# Patient Record
Sex: FEMALE | Race: WHITE | NOT HISPANIC OR LATINO | Employment: FULL TIME | URBAN - METROPOLITAN AREA
[De-identification: names, ages, dates, MRNs, and addresses within clinical notes are randomized per-mention and may not be internally consistent; named-entity substitution may affect disease eponyms.]

---

## 2023-04-14 ENCOUNTER — OFFICE VISIT (OUTPATIENT)
Dept: PRIMARY CARE | Facility: CLINIC | Age: 63
End: 2023-04-14
Payer: COMMERCIAL

## 2023-04-14 VITALS
DIASTOLIC BLOOD PRESSURE: 86 MMHG | SYSTOLIC BLOOD PRESSURE: 140 MMHG | OXYGEN SATURATION: 98 % | WEIGHT: 214 LBS | HEART RATE: 73 BPM | HEIGHT: 67 IN | BODY MASS INDEX: 33.59 KG/M2

## 2023-04-14 DIAGNOSIS — F41.9 ANXIETY: ICD-10-CM

## 2023-04-14 DIAGNOSIS — R91.8 GROUND GLASS OPACITY PRESENT ON IMAGING OF LUNG: ICD-10-CM

## 2023-04-14 DIAGNOSIS — E78.5 HYPERLIPIDEMIA, UNSPECIFIED HYPERLIPIDEMIA TYPE: ICD-10-CM

## 2023-04-14 DIAGNOSIS — R53.83 OTHER FATIGUE: ICD-10-CM

## 2023-04-14 DIAGNOSIS — I10 PRIMARY HYPERTENSION: ICD-10-CM

## 2023-04-14 DIAGNOSIS — R91.8 LUNG NODULES: ICD-10-CM

## 2023-04-14 DIAGNOSIS — K21.9 GASTROESOPHAGEAL REFLUX DISEASE WITHOUT ESOPHAGITIS: ICD-10-CM

## 2023-04-14 DIAGNOSIS — Z00.00 ANNUAL PHYSICAL EXAM: Primary | ICD-10-CM

## 2023-04-14 PROBLEM — N39.0 UTI (URINARY TRACT INFECTION): Status: RESOLVED | Noted: 2023-04-14 | Resolved: 2023-04-14

## 2023-04-14 PROBLEM — R49.0 VOICE HOARSENESS: Status: ACTIVE | Noted: 2023-04-14

## 2023-04-14 PROBLEM — K60.2 ANAL FISSURE: Status: RESOLVED | Noted: 2023-04-14 | Resolved: 2023-04-14

## 2023-04-14 PROBLEM — K60.2 ANAL FISSURE: Status: ACTIVE | Noted: 2023-04-14

## 2023-04-14 PROBLEM — N81.10 BLADDER PROLAPSE, FEMALE, ACQUIRED: Status: ACTIVE | Noted: 2023-04-14

## 2023-04-14 PROBLEM — N39.0 UTI (URINARY TRACT INFECTION): Status: ACTIVE | Noted: 2023-04-14

## 2023-04-14 PROCEDURE — 99396 PREV VISIT EST AGE 40-64: CPT | Performed by: SPECIALIST

## 2023-04-14 PROCEDURE — 3077F SYST BP >= 140 MM HG: CPT | Performed by: SPECIALIST

## 2023-04-14 PROCEDURE — 1036F TOBACCO NON-USER: CPT | Performed by: SPECIALIST

## 2023-04-14 PROCEDURE — 3079F DIAST BP 80-89 MM HG: CPT | Performed by: SPECIALIST

## 2023-04-14 RX ORDER — CHOLECALCIFEROL (VITAMIN D3) 125 MCG
1 CAPSULE ORAL DAILY
COMMUNITY
Start: 2020-01-24

## 2023-04-14 RX ORDER — CALCIUM CARBONATE 600 MG
600 TABLET ORAL DAILY PRN
COMMUNITY
Start: 2020-01-24

## 2023-04-14 RX ORDER — ROSUVASTATIN CALCIUM 5 MG/1
5 TABLET, COATED ORAL 3 TIMES WEEKLY
Qty: 45 TABLET | Refills: 1 | Status: SHIPPED | OUTPATIENT
Start: 2023-04-14 | End: 2023-04-21 | Stop reason: SDUPTHER

## 2023-04-14 RX ORDER — CITALOPRAM 10 MG/1
10 TABLET ORAL DAILY
COMMUNITY
Start: 2020-02-13 | End: 2023-04-14 | Stop reason: SDUPTHER

## 2023-04-14 RX ORDER — LOSARTAN POTASSIUM 25 MG/1
25 TABLET ORAL DAILY
Qty: 90 TABLET | Refills: 1 | Status: SHIPPED | OUTPATIENT
Start: 2023-04-14 | End: 2023-09-18

## 2023-04-14 RX ORDER — ECHINACEA 400 MG
1 CAPSULE ORAL DAILY PRN
COMMUNITY
Start: 2020-01-24

## 2023-04-14 RX ORDER — LANSOPRAZOLE 30 MG/1
30 CAPSULE, DELAYED RELEASE ORAL DAILY
Qty: 90 CAPSULE | Refills: 1 | Status: SHIPPED | OUTPATIENT
Start: 2023-04-14 | End: 2023-08-01 | Stop reason: SINTOL

## 2023-04-14 RX ORDER — ROSUVASTATIN CALCIUM 5 MG/1
5 TABLET, COATED ORAL 3 TIMES WEEKLY
COMMUNITY
End: 2023-04-14 | Stop reason: SDUPTHER

## 2023-04-14 RX ORDER — CITALOPRAM 10 MG/1
10 TABLET ORAL DAILY
Qty: 90 TABLET | Refills: 1 | Status: SHIPPED | OUTPATIENT
Start: 2023-04-14 | End: 2023-11-07

## 2023-04-14 RX ORDER — MULTIVIT-MIN/IRON/FOLIC ACID/K 18-600-40
1 CAPSULE ORAL DAILY
COMMUNITY

## 2023-04-14 RX ORDER — ZINC GLUCONATE 50 MG
1 TABLET ORAL DAILY
COMMUNITY

## 2023-04-14 NOTE — ASSESSMENT & PLAN NOTE
-Previously received annual influenza vaccine  -Previously received 2 covid vaccines, 2 boosters (last 8/2022), recommend bivalent booster  -Had Covid 6/2022  -Previously received Shingrix vaccines  -Previously received Tdap 8/2017  -Recommend Pneumonia vaccine at age 65 (either PCV15 followed by PPSV23 in one year or one PCV20)  -Mammogram 9/2022, recommend annually  -Continue annual gynecology exams  -Prior Colonoscopy 3/6/2020  -Labs ordered:

## 2023-04-14 NOTE — ASSESSMENT & PLAN NOTE
Recommend home bp monitoring   Starting losartan 25 mg daily  Implementing life style modifications

## 2023-04-14 NOTE — ASSESSMENT & PLAN NOTE
-Previously received annual influenza vaccine  -Previously received 2 covid vaccines, 2 boosters (last 8/2022), recommend bivalent booster  -Had Covid 6/2022  -Previously received Shingrix vaccines  -Previously received Tdap 8/2017  -Recommend Pneumonia vaccine at age 64 (either PCV15 followed by PPSV23 in one year or one PCV20)  -Mammogram 9/2022, recommend annually  -Continue annual gynecology exams  -Prior Colonoscopy 3/6/2020  -Reports prior negative screen for hepatitis C at [facility]  -Labs ordered:  CMP cbc TSH free T4 fx lipids

## 2023-04-14 NOTE — ASSESSMENT & PLAN NOTE
Taking 5 mg rosuvastatin 3 days per week, CT cardiac score 0 on 7/2022, ASCVD 7.3%, LDL 94 (10/2022) on rosuvastatin

## 2023-04-14 NOTE — PATIENT INSTRUCTIONS
Schedule CT  Get labs done  Will call with results  Get covid booster  Do home blood pressure monitoring (goal less than 130/80), start medication and follow-up in 2 months for bp check

## 2023-04-14 NOTE — PROGRESS NOTES
Subjective   Patient ID: Christy Costello is a 63 y.o. female who presents for Establish Care.  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  Goes to concerts, socially active    Does therapy and journal  Stopped chewing gum, drinks coffee 3 cups tomato sauce red wine will trigger reflux EGDwith grade a reflux esophagitis in 3/2020, has been off/on lansoprazole    Thinks ganglion cyst, declined ortho hand  Leg cramps at night using mag    Donates blood    Review of Systems  Constitutional  Some fatigue, no fevers, no chills, no unintentional weight loss,  Mood:   No sadness no hopelessness no anhedonia no suicidal ideation, stable on celexa, job is overwhelming  HEENT:   Occasional ocular migraine with floater and dizziness clears with advil and rest, no other headaches, no dizziness, no double vision, no blurred vision, no hearing loss, to see optho   Cardiovascular:  No chest pain, no palpitations, no shortness of breath with exertion (one flight of stairs)  Respiratory:  No cough, no hemoptysis, no wheezing, No shortness of breath at rest  GI:  No dysphagia, no odynophagia, occasional reflux,   no abdominal pain, no nausea, no vomiting, no changes in bowel/bladder habits, no bright red blood per rectum, no melena  :  No urinary frequency, no dysuria, no urine incontinence, nocturia x 2  MSK:  No falls, no joint pain, no joint swelling  Neuro:  No tremors, no extremity weakness, no changes in sensation      Objective   Physical Exam  General:    Well-appearing  F in no acute distress, well nourished, well hydrated  Head:  Normocephalic, atraumatic  Skin:          Warm dry,   Eyes:  Anicteric sclera, pupils equal,    Ears:        TMs intact  Oral:      Mask in place, Not examined due to pandemic  Neck:   Supple, no cervical/supraclavicular adenopathy, no thyromegaly or nodules appreciated on exam  Cor:      Regular rate, normal S1, S2, no murmurs appreciated, no S3, no S4   Lungs:   Clear to auscultation b/l, no wheezes, no rhonchi, no crackles, no accessory respiratory muscle use  Abd:          Soft, nontender, no guarding, no rebound, no hepatosplenomegaly appreciated   Ext:            No lower extremity edema, no palpable cords  Pulses:      Pedal pulses intact  Neuro:   CN2-12 grossly intact  (except CN 9-10, 12 not examined due to pandemic)    Breasts:     Not examined sees gynecology        Assessment/Plan   Problem List Items Addressed This Visit          Respiratory    Ground glass opacity present on imaging of lung     Quit smoking in 1987, incidentally noted 2 lung nodules on CT Cardiac Score, 4 mm LLL solid pleural based nodule and 2-3 mm solid nodule RLL and 4 mm ground glass nodular density RLL         Relevant Orders    CT chest wo IV contrast    CBC    Lung nodules     Quit smoking in 1987, incidentally noted 2 lung nodules on CT Cardiac Score, 4 mm LLL solid pleural based nodule and 2-3 mm solid nodule RLL and 4 mm ground glass nodular density RLL         Relevant Orders    CT chest wo IV contrast    CBC       Circulatory    Primary hypertension     Recommend home bp monitoring   Starting losartan 25 mg daily  Implementing life style modifications         Relevant Medications    losartan (Cozaar) 25 mg tablet       Digestive    GERD (gastroesophageal reflux disease)     EGD 3/2020 grade a reflux esophagitis, takes off/on lansoprazole 30 mg with good relief, ordered         Relevant Medications    lansoprazole (Prevacid) 30 mg DR capsule       Other    Anxiety     Continue therapy, stable on celexa 10 mg, refilled         Relevant Medications    citalopram (CeleXA) 10 mg tablet    Hyperlipidemia     Taking 5 mg  rosuvastatin 3 days per week, CT cardiac score 0 on 7/2022, ASCVD 7.3%, LDL 94 (10/2022) on rosuvastatin           Relevant Medications    rosuvastatin (Crestor) 5 mg tablet    Other Relevant Orders    Comprehensive Metabolic Panel    Lipid Panel    Annual physical exam - Primary     -Previously received annual influenza vaccine  -Previously received 2 covid vaccines, 2 boosters (last 8/2022), recommend bivalent booster  -Had Covid 6/2022  -Previously received Shingrix vaccines  -Previously received Tdap 8/2017  -Recommend Pneumonia vaccine at age 64 (either PCV15 followed by PPSV23 in one year or one PCV20)  -Mammogram 9/2022, recommend annually  -Continue annual gynecology exams  -Prior Colonoscopy 3/6/2020  -Reports prior negative screen for hepatitis C at [facility]  -Labs ordered:  CMP cbc TSH free T4 fx lipids         Relevant Orders    Comprehensive Metabolic Panel    TSH    T4, free    CBC    Other fatigue    Relevant Orders    TSH    T4, free    CBC          Annabel Worthy DO

## 2023-04-14 NOTE — ASSESSMENT & PLAN NOTE
Quit smoking in 1987, incidentally noted 2 lung nodules on CT Cardiac Score, 4 mm LLL solid pleural based nodule and 2-3 mm solid nodule RLL and 4 mm ground glass nodular density RLL

## 2023-04-17 ENCOUNTER — LAB (OUTPATIENT)
Dept: LAB | Facility: LAB | Age: 63
End: 2023-04-17
Payer: COMMERCIAL

## 2023-04-17 DIAGNOSIS — R91.8 LUNG NODULES: ICD-10-CM

## 2023-04-17 DIAGNOSIS — R91.8 GROUND GLASS OPACITY PRESENT ON IMAGING OF LUNG: ICD-10-CM

## 2023-04-17 DIAGNOSIS — Z00.00 ANNUAL PHYSICAL EXAM: ICD-10-CM

## 2023-04-17 DIAGNOSIS — R53.83 OTHER FATIGUE: ICD-10-CM

## 2023-04-17 DIAGNOSIS — E78.5 HYPERLIPIDEMIA, UNSPECIFIED HYPERLIPIDEMIA TYPE: ICD-10-CM

## 2023-04-17 LAB
ALANINE AMINOTRANSFERASE (SGPT) (U/L) IN SER/PLAS: 18 U/L (ref 7–45)
ALBUMIN (G/DL) IN SER/PLAS: 4.3 G/DL (ref 3.4–5)
ALKALINE PHOSPHATASE (U/L) IN SER/PLAS: 65 U/L (ref 33–136)
ANION GAP IN SER/PLAS: 13 MMOL/L (ref 10–20)
ASPARTATE AMINOTRANSFERASE (SGOT) (U/L) IN SER/PLAS: 24 U/L (ref 9–39)
BILIRUBIN TOTAL (MG/DL) IN SER/PLAS: 0.7 MG/DL (ref 0–1.2)
CALCIUM (MG/DL) IN SER/PLAS: 9.5 MG/DL (ref 8.6–10.6)
CARBON DIOXIDE, TOTAL (MMOL/L) IN SER/PLAS: 25 MMOL/L (ref 21–32)
CHLORIDE (MMOL/L) IN SER/PLAS: 106 MMOL/L (ref 98–107)
CHOLESTEROL (MG/DL) IN SER/PLAS: 181 MG/DL (ref 0–199)
CHOLESTEROL IN HDL (MG/DL) IN SER/PLAS: 42.7 MG/DL
CHOLESTEROL/HDL RATIO: 4.2
CREATININE (MG/DL) IN SER/PLAS: 1 MG/DL (ref 0.5–1.05)
ERYTHROCYTE DISTRIBUTION WIDTH (RATIO) BY AUTOMATED COUNT: 15.5 % (ref 11.5–14.5)
ERYTHROCYTE MEAN CORPUSCULAR HEMOGLOBIN CONCENTRATION (G/DL) BY AUTOMATED: 30.5 G/DL (ref 32–36)
ERYTHROCYTE MEAN CORPUSCULAR VOLUME (FL) BY AUTOMATED COUNT: 89 FL (ref 80–100)
ERYTHROCYTES (10*6/UL) IN BLOOD BY AUTOMATED COUNT: 4.97 X10E12/L (ref 4–5.2)
GFR FEMALE: 63 ML/MIN/1.73M2
GLUCOSE (MG/DL) IN SER/PLAS: 95 MG/DL (ref 74–99)
HEMATOCRIT (%) IN BLOOD BY AUTOMATED COUNT: 44.2 % (ref 36–46)
HEMOGLOBIN (G/DL) IN BLOOD: 13.5 G/DL (ref 12–16)
LDL: 114 MG/DL (ref 0–99)
LEUKOCYTES (10*3/UL) IN BLOOD BY AUTOMATED COUNT: 7.1 X10E9/L (ref 4.4–11.3)
NRBC (PER 100 WBCS) BY AUTOMATED COUNT: 0 /100 WBC (ref 0–0)
PLATELETS (10*3/UL) IN BLOOD AUTOMATED COUNT: 280 X10E9/L (ref 150–450)
POTASSIUM (MMOL/L) IN SER/PLAS: 4.7 MMOL/L (ref 3.5–5.3)
PROTEIN TOTAL: 7.4 G/DL (ref 6.4–8.2)
SODIUM (MMOL/L) IN SER/PLAS: 139 MMOL/L (ref 136–145)
THYROTROPIN (MIU/L) IN SER/PLAS BY DETECTION LIMIT <= 0.05 MIU/L: 2.55 MIU/L (ref 0.44–3.98)
THYROXINE (T4) FREE (NG/DL) IN SER/PLAS: 0.93 NG/DL (ref 0.78–1.48)
TRIGLYCERIDE (MG/DL) IN SER/PLAS: 124 MG/DL (ref 0–149)
UREA NITROGEN (MG/DL) IN SER/PLAS: 12 MG/DL (ref 6–23)
VLDL: 25 MG/DL (ref 0–40)

## 2023-04-17 PROCEDURE — 85027 COMPLETE CBC AUTOMATED: CPT

## 2023-04-17 PROCEDURE — 36415 COLL VENOUS BLD VENIPUNCTURE: CPT

## 2023-04-17 PROCEDURE — 84439 ASSAY OF FREE THYROXINE: CPT

## 2023-04-17 PROCEDURE — 80061 LIPID PANEL: CPT

## 2023-04-17 PROCEDURE — 84443 ASSAY THYROID STIM HORMONE: CPT

## 2023-04-17 PROCEDURE — 80053 COMPREHEN METABOLIC PANEL: CPT

## 2023-04-21 DIAGNOSIS — I10 PRIMARY HYPERTENSION: Primary | ICD-10-CM

## 2023-04-21 DIAGNOSIS — E78.5 HYPERLIPIDEMIA, UNSPECIFIED HYPERLIPIDEMIA TYPE: ICD-10-CM

## 2023-04-21 RX ORDER — ROSUVASTATIN CALCIUM 5 MG/1
5 TABLET, COATED ORAL DAILY
Qty: 90 TABLET | Refills: 1 | Status: SHIPPED | OUTPATIENT
Start: 2023-04-21 | End: 2023-10-31

## 2023-07-20 ENCOUNTER — LAB (OUTPATIENT)
Dept: LAB | Facility: LAB | Age: 63
End: 2023-07-20
Payer: COMMERCIAL

## 2023-07-20 DIAGNOSIS — E78.5 HYPERLIPIDEMIA, UNSPECIFIED HYPERLIPIDEMIA TYPE: ICD-10-CM

## 2023-07-20 DIAGNOSIS — I10 PRIMARY HYPERTENSION: ICD-10-CM

## 2023-07-20 LAB
ALANINE AMINOTRANSFERASE (SGPT) (U/L) IN SER/PLAS: 18 U/L (ref 7–45)
ALBUMIN (G/DL) IN SER/PLAS: 4 G/DL (ref 3.4–5)
ALKALINE PHOSPHATASE (U/L) IN SER/PLAS: 56 U/L (ref 33–136)
ASPARTATE AMINOTRANSFERASE (SGOT) (U/L) IN SER/PLAS: 22 U/L (ref 9–39)
BILIRUBIN DIRECT (MG/DL) IN SER/PLAS: 0.1 MG/DL (ref 0–0.3)
BILIRUBIN TOTAL (MG/DL) IN SER/PLAS: 0.6 MG/DL (ref 0–1.2)
CHOLESTEROL (MG/DL) IN SER/PLAS: 118 MG/DL (ref 0–199)
CHOLESTEROL IN HDL (MG/DL) IN SER/PLAS: 42.4 MG/DL
CHOLESTEROL/HDL RATIO: 2.8
ERYTHROCYTE DISTRIBUTION WIDTH (RATIO) BY AUTOMATED COUNT: 16.8 % (ref 11.5–14.5)
ERYTHROCYTE MEAN CORPUSCULAR HEMOGLOBIN CONCENTRATION (G/DL) BY AUTOMATED: 30.6 G/DL (ref 32–36)
ERYTHROCYTE MEAN CORPUSCULAR VOLUME (FL) BY AUTOMATED COUNT: 93 FL (ref 80–100)
ERYTHROCYTES (10*6/UL) IN BLOOD BY AUTOMATED COUNT: 4.8 X10E12/L (ref 4–5.2)
HEMATOCRIT (%) IN BLOOD BY AUTOMATED COUNT: 44.5 % (ref 36–46)
HEMOGLOBIN (G/DL) IN BLOOD: 13.6 G/DL (ref 12–16)
LDL: 57 MG/DL (ref 0–99)
LEUKOCYTES (10*3/UL) IN BLOOD BY AUTOMATED COUNT: 6 X10E9/L (ref 4.4–11.3)
NRBC (PER 100 WBCS) BY AUTOMATED COUNT: 0 /100 WBC (ref 0–0)
PLATELETS (10*3/UL) IN BLOOD AUTOMATED COUNT: 260 X10E9/L (ref 150–450)
PROTEIN TOTAL: 6.7 G/DL (ref 6.4–8.2)
TRIGLYCERIDE (MG/DL) IN SER/PLAS: 94 MG/DL (ref 0–149)
VLDL: 19 MG/DL (ref 0–40)

## 2023-07-20 PROCEDURE — 36415 COLL VENOUS BLD VENIPUNCTURE: CPT

## 2023-07-20 PROCEDURE — 85027 COMPLETE CBC AUTOMATED: CPT

## 2023-07-20 PROCEDURE — 80076 HEPATIC FUNCTION PANEL: CPT

## 2023-07-20 PROCEDURE — 80061 LIPID PANEL: CPT

## 2023-07-26 ENCOUNTER — OFFICE VISIT (OUTPATIENT)
Dept: PRIMARY CARE | Facility: CLINIC | Age: 63
End: 2023-07-26
Payer: COMMERCIAL

## 2023-07-26 VITALS
BODY MASS INDEX: 33.83 KG/M2 | SYSTOLIC BLOOD PRESSURE: 124 MMHG | OXYGEN SATURATION: 91 % | HEART RATE: 103 BPM | DIASTOLIC BLOOD PRESSURE: 78 MMHG | WEIGHT: 216 LBS

## 2023-07-26 DIAGNOSIS — E78.5 HYPERLIPIDEMIA, UNSPECIFIED HYPERLIPIDEMIA TYPE: ICD-10-CM

## 2023-07-26 DIAGNOSIS — R25.2 LEG CRAMPS: ICD-10-CM

## 2023-07-26 DIAGNOSIS — I10 PRIMARY HYPERTENSION: Primary | ICD-10-CM

## 2023-07-26 DIAGNOSIS — K21.9 GASTROESOPHAGEAL REFLUX DISEASE WITHOUT ESOPHAGITIS: ICD-10-CM

## 2023-07-26 PROCEDURE — 99214 OFFICE O/P EST MOD 30 MIN: CPT | Performed by: SPECIALIST

## 2023-07-26 PROCEDURE — 1036F TOBACCO NON-USER: CPT | Performed by: SPECIALIST

## 2023-07-26 PROCEDURE — 3078F DIAST BP <80 MM HG: CPT | Performed by: SPECIALIST

## 2023-07-26 PROCEDURE — 3074F SYST BP LT 130 MM HG: CPT | Performed by: SPECIALIST

## 2023-07-26 ASSESSMENT — PATIENT HEALTH QUESTIONNAIRE - PHQ9
2. FEELING DOWN, DEPRESSED OR HOPELESS: NOT AT ALL
SUM OF ALL RESPONSES TO PHQ9 QUESTIONS 1 AND 2: 0
1. LITTLE INTEREST OR PLEASURE IN DOING THINGS: NOT AT ALL

## 2023-07-26 ASSESSMENT — ENCOUNTER SYMPTOMS
DEPRESSION: 0
OCCASIONAL FEELINGS OF UNSTEADINESS: 0
LOSS OF SENSATION IN FEET: 0

## 2023-07-26 NOTE — PROGRESS NOTES
Subjective   Patient ID: Christy Costello is a 63 y.o. female who presents for Follow-up (BP  and lipids).  HPI    62 yo female Pmhx sig for Basal Cell Skin Cancer (sees derm annually), Hyperlipidemia, Gerd, Anxiety here for follow-up    Gets intense leg cramp when lying down  Discussed could try low dose CoQ 10 and stretching before going to bed    Unsure of vitamin dose  She stopped prevacid    Gets GI symptom on prevacid, gets diarrhea     Takes mag-glycinate 625     Eating better, exercising more, weight last visit 214 and weight today 216, feels some bloating    Getting Chest CT in August for ground glass nodules and nodules on CT cardiac score last year    Playing [employer] ball, bicycling regularly   LDL down to 57 on 5 mg daily rosuvastatin  Labs reviewed and discussed  Home Blood pressure 124/83     Did not f/up with GI about esophagitis (negative eosinophil esophagitis on bx)/mild chronic inactive gastritis    Allergies   Allergen Reactions    Pollen Extracts Itching and Runny nose      Current Outpatient Medications   Medication Instructions    ascorbic acid, vitamin C, 500 mg capsule 1 tablet, oral, Daily    calcium carbonate 600 mg, oral, Daily PRN    cholecalciferol (Vitamin D-3) 125 MCG (5000 UT) capsule 1 capsule, oral, Daily    citalopram (CELEXA) 10 mg, oral, Daily    elderberry fruit and flower 460-115 mg capsule 1 tablet, oral, Daily PRN    lansoprazole (PREVACID) 30 mg, oral, Daily, Do not crush or chew.     losartan (COZAAR) 25 mg, oral, Daily    magnesium oxide 500 mg capsule 1 capsule, oral, Daily    rosuvastatin (CRESTOR) 5 mg, oral, Daily       Review of Systems  Constitutional  No fatigue, no fevers, no chills, no unintentional weight loss,   HEENT:  No headaches, no dizziness,  Cardiovascular:  No chest pain, no palpitations, no shortness of breath with exertion (one flight of stairs)  Respiratory:  No cough, no hemoptysis, no wheezing, No shortness of breath at rest  GI:  No dysphagia, no  odynophagia, no reflux, decreased throat symptoms,   no abdominal pain, no nausea, no vomiting, no changes in bowel habits, no bright red blood per rectum, no melena  :  No urinary frequency, no dysuria, no urine incontinence  MSK:  No falls, hand feet joint pain, no joint swelling    Physical Exam  /78   Pulse 103   Wt 98 kg (216 lb)   SpO2 91%   BMI 33.83 kg/m²   124/78 right arm large cuff  General:    Well-appearing  F in no acute distress, well nourished, well hydrated  Head:  Normocephalic, atraumatic  Skin:          Warm dry,   Eyes:  Anicteric sclera, pupils equal,   Oral:      Not examined due to pandemic  Neck:   Supple,   Cor:      Regular rate, normal S1, S2, no murmurs appreciated, no S3, no S4   Lungs:   Clear to auscultation b/l, no wheezes, no rhonchi, no crackles, no accessory respiratory muscle use  Abd:          Soft, nontender, no guarding, no rebound, no hepatosplenomegaly appreciated       Assessment/Plan   Problem List Items Addressed This Visit       Hyperlipidemia     LDL much improved, down to 57 (was 114) and Normal LFTs (7/20/2023) on rosuvastatin 5 mg daily, continue current medication           Primary hypertension - Primary     Home BP running 124/83  BP was 124/78 today  Continue current medication Losartan 25 mg daily  Continues to implement life style modifications            Due for wellness exam in October       Annabel Worthy DO

## 2023-08-01 PROBLEM — R25.2 LEG CRAMPS: Status: ACTIVE | Noted: 2023-08-01

## 2023-08-01 NOTE — ASSESSMENT & PLAN NOTE
Home BP running 124/83  BP was 124/78 today  Continue current medication Losartan 25 mg daily  Continues to implement life style modifications

## 2023-08-01 NOTE — ASSESSMENT & PLAN NOTE
Chronic active gastritis on EGD 2020  Intolerant of PPI Prevacid due to diarrhea  Consider Pepcid as needed

## 2023-08-01 NOTE — ASSESSMENT & PLAN NOTE
LDL much improved, down to 57 (was 114) and Normal LFTs (7/20/2023) on rosuvastatin 5 mg daily, continue current medication

## 2023-08-01 NOTE — ASSESSMENT & PLAN NOTE
Discussed, to try nocturnal stretching, may try adding CoQ10  To let me know if cramps persist, will check CK

## 2023-09-18 DIAGNOSIS — I10 PRIMARY HYPERTENSION: ICD-10-CM

## 2023-09-18 RX ORDER — LOSARTAN POTASSIUM 25 MG/1
25 TABLET ORAL DAILY
Qty: 90 TABLET | Refills: 0 | Status: SHIPPED | OUTPATIENT
Start: 2023-09-18 | End: 2023-12-19

## 2023-09-27 ENCOUNTER — APPOINTMENT (OUTPATIENT)
Dept: PRIMARY CARE | Facility: CLINIC | Age: 63
End: 2023-09-27
Payer: COMMERCIAL

## 2023-10-06 ENCOUNTER — APPOINTMENT (OUTPATIENT)
Dept: RADIOLOGY | Facility: CLINIC | Age: 63
End: 2023-10-06
Payer: COMMERCIAL

## 2023-10-06 DIAGNOSIS — Z12.31 SCREENING MAMMOGRAM FOR BREAST CANCER: ICD-10-CM

## 2023-10-30 DIAGNOSIS — E78.5 HYPERLIPIDEMIA, UNSPECIFIED HYPERLIPIDEMIA TYPE: ICD-10-CM

## 2023-10-31 RX ORDER — ROSUVASTATIN CALCIUM 5 MG/1
5 TABLET, COATED ORAL DAILY
Qty: 90 TABLET | Refills: 3 | Status: SHIPPED | OUTPATIENT
Start: 2023-10-31

## 2023-11-06 DIAGNOSIS — F41.9 ANXIETY: ICD-10-CM

## 2023-11-07 RX ORDER — CITALOPRAM 10 MG/1
10 TABLET ORAL DAILY
Qty: 90 TABLET | Refills: 1 | Status: SHIPPED | OUTPATIENT
Start: 2023-11-07 | End: 2024-05-07

## 2023-11-20 ENCOUNTER — LAB (OUTPATIENT)
Dept: LAB | Facility: LAB | Age: 63
End: 2023-11-20
Payer: COMMERCIAL

## 2023-11-20 DIAGNOSIS — E78.5 HYPERLIPIDEMIA, UNSPECIFIED HYPERLIPIDEMIA TYPE: ICD-10-CM

## 2023-11-20 DIAGNOSIS — I10 PRIMARY HYPERTENSION: ICD-10-CM

## 2023-11-20 LAB
ALBUMIN SERPL BCP-MCNC: 4.1 G/DL (ref 3.4–5)
ALP SERPL-CCNC: 57 U/L (ref 33–136)
ALT SERPL W P-5'-P-CCNC: 13 U/L (ref 7–45)
ANION GAP SERPL CALC-SCNC: 13 MMOL/L (ref 10–20)
AST SERPL W P-5'-P-CCNC: 19 U/L (ref 9–39)
BILIRUB SERPL-MCNC: 0.5 MG/DL (ref 0–1.2)
BUN SERPL-MCNC: 13 MG/DL (ref 6–23)
CALCIUM SERPL-MCNC: 9.3 MG/DL (ref 8.6–10.6)
CHLORIDE SERPL-SCNC: 106 MMOL/L (ref 98–107)
CHOLEST SERPL-MCNC: 121 MG/DL (ref 0–199)
CHOLESTEROL/HDL RATIO: 2.8
CO2 SERPL-SCNC: 27 MMOL/L (ref 21–32)
CREAT SERPL-MCNC: 0.96 MG/DL (ref 0.5–1.05)
GFR SERPL CREATININE-BSD FRML MDRD: 66 ML/MIN/1.73M*2
GLUCOSE SERPL-MCNC: 93 MG/DL (ref 74–99)
HDLC SERPL-MCNC: 42.6 MG/DL
LDLC SERPL CALC-MCNC: 58 MG/DL
NON HDL CHOLESTEROL: 78 MG/DL (ref 0–149)
POTASSIUM SERPL-SCNC: 4.8 MMOL/L (ref 3.5–5.3)
PROT SERPL-MCNC: 7.1 G/DL (ref 6.4–8.2)
SODIUM SERPL-SCNC: 141 MMOL/L (ref 136–145)
TRIGL SERPL-MCNC: 104 MG/DL (ref 0–149)
VLDL: 21 MG/DL (ref 0–40)

## 2023-11-20 PROCEDURE — 36415 COLL VENOUS BLD VENIPUNCTURE: CPT

## 2023-11-20 PROCEDURE — 80061 LIPID PANEL: CPT

## 2023-11-20 PROCEDURE — 80053 COMPREHEN METABOLIC PANEL: CPT

## 2023-11-28 ENCOUNTER — APPOINTMENT (OUTPATIENT)
Dept: RADIOLOGY | Facility: CLINIC | Age: 63
End: 2023-11-28
Payer: COMMERCIAL

## 2023-12-18 DIAGNOSIS — I10 PRIMARY HYPERTENSION: ICD-10-CM

## 2023-12-19 RX ORDER — LOSARTAN POTASSIUM 25 MG/1
25 TABLET ORAL DAILY
Qty: 90 TABLET | Refills: 2 | Status: SHIPPED | OUTPATIENT
Start: 2023-12-19

## 2023-12-27 ENCOUNTER — ANCILLARY PROCEDURE (OUTPATIENT)
Dept: RADIOLOGY | Facility: CLINIC | Age: 63
End: 2023-12-27
Payer: COMMERCIAL

## 2023-12-27 DIAGNOSIS — Z12.39 ENCOUNTER FOR OTHER SCREENING FOR MALIGNANT NEOPLASM OF BREAST: ICD-10-CM

## 2023-12-27 PROCEDURE — 77067 SCR MAMMO BI INCL CAD: CPT

## 2023-12-27 PROCEDURE — 77063 BREAST TOMOSYNTHESIS BI: CPT | Mod: BILATERAL PROCEDURE | Performed by: RADIOLOGY

## 2023-12-27 PROCEDURE — 77067 SCR MAMMO BI INCL CAD: CPT | Mod: BILATERAL PROCEDURE | Performed by: RADIOLOGY

## 2024-01-08 ENCOUNTER — APPOINTMENT (OUTPATIENT)
Dept: OBSTETRICS AND GYNECOLOGY | Facility: CLINIC | Age: 64
End: 2024-01-08
Payer: COMMERCIAL

## 2024-05-06 DIAGNOSIS — F41.9 ANXIETY: ICD-10-CM

## 2024-05-07 RX ORDER — CITALOPRAM 10 MG/1
10 TABLET ORAL DAILY
Qty: 90 TABLET | Refills: 1 | Status: SHIPPED | OUTPATIENT
Start: 2024-05-07

## 2024-05-28 DIAGNOSIS — Z00.00 MEDICARE WELCOME VISIT: ICD-10-CM

## 2024-05-28 DIAGNOSIS — Z00.00 ANNUAL PHYSICAL EXAM: ICD-10-CM

## 2024-05-28 DIAGNOSIS — Z11.59 ENCOUNTER FOR SCREENING FOR OTHER VIRAL DISEASES: Primary | ICD-10-CM

## 2024-05-28 DIAGNOSIS — E78.5 HYPERLIPIDEMIA, UNSPECIFIED HYPERLIPIDEMIA TYPE: ICD-10-CM

## 2024-05-28 DIAGNOSIS — I10 PRIMARY HYPERTENSION: ICD-10-CM

## 2024-08-08 ENCOUNTER — LAB (OUTPATIENT)
Dept: LAB | Facility: LAB | Age: 64
End: 2024-08-08
Payer: MEDICARE

## 2024-08-08 DIAGNOSIS — E78.5 HYPERLIPIDEMIA, UNSPECIFIED HYPERLIPIDEMIA TYPE: ICD-10-CM

## 2024-08-08 DIAGNOSIS — Z11.59 ENCOUNTER FOR SCREENING FOR OTHER VIRAL DISEASES: ICD-10-CM

## 2024-08-08 DIAGNOSIS — I10 PRIMARY HYPERTENSION: ICD-10-CM

## 2024-08-08 LAB
ALBUMIN SERPL BCP-MCNC: 4.2 G/DL (ref 3.4–5)
ALP SERPL-CCNC: 60 U/L (ref 33–136)
ALT SERPL W P-5'-P-CCNC: 18 U/L (ref 7–45)
ANION GAP SERPL CALC-SCNC: 12 MMOL/L (ref 10–20)
AST SERPL W P-5'-P-CCNC: 25 U/L (ref 9–39)
BILIRUB SERPL-MCNC: 0.5 MG/DL (ref 0–1.2)
BUN SERPL-MCNC: 11 MG/DL (ref 6–23)
CALCIUM SERPL-MCNC: 9.2 MG/DL (ref 8.6–10.6)
CHLORIDE SERPL-SCNC: 108 MMOL/L (ref 98–107)
CHOLEST SERPL-MCNC: 124 MG/DL (ref 0–199)
CHOLESTEROL/HDL RATIO: 3
CO2 SERPL-SCNC: 26 MMOL/L (ref 21–32)
CREAT SERPL-MCNC: 0.88 MG/DL (ref 0.5–1.05)
EGFRCR SERPLBLD CKD-EPI 2021: 73 ML/MIN/1.73M*2
ERYTHROCYTE [DISTWIDTH] IN BLOOD BY AUTOMATED COUNT: 16 % (ref 11.5–14.5)
GLUCOSE SERPL-MCNC: 104 MG/DL (ref 74–99)
HCT VFR BLD AUTO: 45 % (ref 36–46)
HCV AB SER QL: NONREACTIVE
HDLC SERPL-MCNC: 41.4 MG/DL
HGB BLD-MCNC: 14.2 G/DL (ref 12–16)
LDLC SERPL CALC-MCNC: 63 MG/DL
MCH RBC QN AUTO: 28.3 PG (ref 26–34)
MCHC RBC AUTO-ENTMCNC: 31.6 G/DL (ref 32–36)
MCV RBC AUTO: 90 FL (ref 80–100)
NON HDL CHOLESTEROL: 83 MG/DL (ref 0–149)
NRBC BLD-RTO: 0 /100 WBCS (ref 0–0)
PLATELET # BLD AUTO: 254 X10*3/UL (ref 150–450)
POTASSIUM SERPL-SCNC: 4.9 MMOL/L (ref 3.5–5.3)
PROT SERPL-MCNC: 7.1 G/DL (ref 6.4–8.2)
RBC # BLD AUTO: 5.01 X10*6/UL (ref 4–5.2)
SODIUM SERPL-SCNC: 141 MMOL/L (ref 136–145)
TRIGL SERPL-MCNC: 99 MG/DL (ref 0–149)
VLDL: 20 MG/DL (ref 0–40)
WBC # BLD AUTO: 6.6 X10*3/UL (ref 4.4–11.3)

## 2024-08-08 PROCEDURE — 85027 COMPLETE CBC AUTOMATED: CPT

## 2024-08-08 PROCEDURE — 86803 HEPATITIS C AB TEST: CPT

## 2024-08-08 PROCEDURE — 80053 COMPREHEN METABOLIC PANEL: CPT

## 2024-08-08 PROCEDURE — 80061 LIPID PANEL: CPT

## 2024-08-16 ENCOUNTER — APPOINTMENT (OUTPATIENT)
Dept: PRIMARY CARE | Facility: CLINIC | Age: 64
End: 2024-08-16
Payer: COMMERCIAL

## 2024-08-16 VITALS
HEART RATE: 80 BPM | WEIGHT: 220.6 LBS | OXYGEN SATURATION: 97 % | BODY MASS INDEX: 34.62 KG/M2 | SYSTOLIC BLOOD PRESSURE: 112 MMHG | DIASTOLIC BLOOD PRESSURE: 78 MMHG | HEIGHT: 67 IN

## 2024-08-16 DIAGNOSIS — Z00.00 WELCOME TO MEDICARE PREVENTIVE VISIT: Primary | ICD-10-CM

## 2024-08-16 DIAGNOSIS — R91.1 LUNG NODULE SEEN ON IMAGING STUDY: ICD-10-CM

## 2024-08-16 DIAGNOSIS — E66.9 OBESITY, CLASS II, BMI 35-39.9, ISOLATED (SEE ACTUAL BMI): ICD-10-CM

## 2024-08-16 DIAGNOSIS — K76.0 FATTY LIVER: ICD-10-CM

## 2024-08-16 DIAGNOSIS — E78.5 HYPERLIPIDEMIA, UNSPECIFIED HYPERLIPIDEMIA TYPE: ICD-10-CM

## 2024-08-16 DIAGNOSIS — I10 PRIMARY HYPERTENSION: ICD-10-CM

## 2024-08-16 DIAGNOSIS — F33.41 MAJOR DEPRESSIVE DISORDER, RECURRENT EPISODE, IN PARTIAL REMISSION (CMS-HCC): ICD-10-CM

## 2024-08-16 DIAGNOSIS — F41.1 GAD (GENERALIZED ANXIETY DISORDER): ICD-10-CM

## 2024-08-16 DIAGNOSIS — K21.9 GASTROESOPHAGEAL REFLUX DISEASE WITHOUT ESOPHAGITIS: ICD-10-CM

## 2024-08-16 PROBLEM — F33.1 MODERATE EPISODE OF RECURRENT MAJOR DEPRESSIVE DISORDER (MULTI): Status: ACTIVE | Noted: 2024-08-16

## 2024-08-16 PROCEDURE — G0402 INITIAL PREVENTIVE EXAM: HCPCS | Performed by: SPECIALIST

## 2024-08-16 PROCEDURE — 1159F MED LIST DOCD IN RCRD: CPT | Performed by: SPECIALIST

## 2024-08-16 PROCEDURE — 3008F BODY MASS INDEX DOCD: CPT | Performed by: SPECIALIST

## 2024-08-16 PROCEDURE — 3078F DIAST BP <80 MM HG: CPT | Performed by: SPECIALIST

## 2024-08-16 PROCEDURE — 1170F FXNL STATUS ASSESSED: CPT | Performed by: SPECIALIST

## 2024-08-16 PROCEDURE — 3074F SYST BP LT 130 MM HG: CPT | Performed by: SPECIALIST

## 2024-08-16 PROCEDURE — 1160F RVW MEDS BY RX/DR IN RCRD: CPT | Performed by: SPECIALIST

## 2024-08-16 RX ORDER — LANSOPRAZOLE 30 MG/1
30 CAPSULE, DELAYED RELEASE ORAL
Qty: 90 CAPSULE | Refills: 1 | Status: SHIPPED | OUTPATIENT
Start: 2024-08-16 | End: 2024-08-19

## 2024-08-16 RX ORDER — CITALOPRAM 20 MG/1
20 TABLET, FILM COATED ORAL DAILY
Qty: 90 TABLET | Refills: 1 | Status: SHIPPED | OUTPATIENT
Start: 2024-08-16 | End: 2025-02-12

## 2024-08-16 ASSESSMENT — PATIENT HEALTH QUESTIONNAIRE - PHQ9
SUM OF ALL RESPONSES TO PHQ9 QUESTIONS 1 AND 2: 0
1. LITTLE INTEREST OR PLEASURE IN DOING THINGS: NOT AT ALL
2. FEELING DOWN, DEPRESSED OR HOPELESS: NOT AT ALL
SUM OF ALL RESPONSES TO PHQ9 QUESTIONS 1 AND 2: 0
2. FEELING DOWN, DEPRESSED OR HOPELESS: NOT AT ALL
1. LITTLE INTEREST OR PLEASURE IN DOING THINGS: NOT AT ALL

## 2024-08-16 ASSESSMENT — VISUAL ACUITY
OD_CC: 20/20
OS_CC: 20/20

## 2024-08-16 ASSESSMENT — ACTIVITIES OF DAILY LIVING (ADL)
DOING_HOUSEWORK: INDEPENDENT
TAKING_MEDICATION: INDEPENDENT
MANAGING_FINANCES: INDEPENDENT
BATHING: INDEPENDENT
DRESSING: INDEPENDENT
GROCERY_SHOPPING: INDEPENDENT

## 2024-08-16 ASSESSMENT — ENCOUNTER SYMPTOMS
OCCASIONAL FEELINGS OF UNSTEADINESS: 0
DEPRESSION: 0
LOSS OF SENSATION IN FEET: 0

## 2024-08-16 NOTE — PROGRESS NOTES
Subjective   Patient ID: Christy Costello is a 64 y.o. female who presents for Annual Exam.  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  No history of rash, hives, anaphylaxis, or other hypersensitivity reaction.      Current Outpatient Medications   Medication Instructions    calcium carbonate 600 mg, oral, Daily PRN    cholecalciferol (Vitamin D-3) 125 MCG (5000 UT) capsule 1 capsule, oral, Daily    citalopram (CELEXA) 20 mg, oral, Daily    elderberry fruit and flower 460-115 mg capsule 1 tablet, oral, Daily PRN    losartan (COZAAR) 25 mg, oral, Daily    magnesium oxide 500 mg capsule 1 capsule, oral, Daily    omeprazole (PRILOSEC) 20 mg, oral, Daily, Do not crush or chew.    rosuvastatin (CRESTOR) 5 mg, oral, Daily        Review of Systems  Constitutional  No fatigue, no fevers, no chills, no unintentional weight loss (Up 4 #)  Mood:  not hopeless, occasionally not daily said, no anhedonia, no teaarful increased anxiety (but 3 major life changes) no suicidal ideation no passive death wish,  HEENT:  No headaches, no dizziness, no double vision, no blurred vision, eye exams current, no hearing loss  Cardiovascular:  No  "chest pain, no palpitations, no shortness of breath with exertion (one flight of stairs )Respiratory:  No cough, no hemoptysis, no wheezing, No shortness of breath at rest  GI:  No dysphagia, no odynophagia, Daily reflux symptoms, stopped PPI 2 mos, no abdominal pain, no nausea, no vomiting, no changes in bowel habits, no bright red blood per rectum, no melena  :  No urinary frequency, no dysuria, no urine incontinence  MSK:  No falls, No joint pain stiffness in fingers no other joints, no joint swelling  Neuro:  No tremors, no extremity weakness, no changes in sensation    Physical Exam  /78   Pulse 80   Ht 1.69 m (5' 6.54\")   Wt 100 kg (220 lb 9.6 oz)   SpO2 97%   BMI 35.04 kg/m²   /78 left large cuff  General:    Well-appearing  F in no acute distress, well nourished, well hydrated  Head:  Normocephalic, atraumatic  Skin:          Warm dry,   Eyes:  Anicteric sclera, pupils equal,   Ears:        TMs intact  Oral:      Not examined due to pandemic  Neck:   Supple, no cervical/supraclavicular adenopathy, no thyromegaly or nodules appreciated on exam  Cor:      Regular rate, normal S1, S2, no murmurs appreciated, no S3, no S4   Lungs:   Clear to auscultation b/l, no wheezes, no rhonchi, no crackles, no accessory respiratory muscle use  Abd:          Soft, nontender, no guarding, no rebound, no hepatosplenomegaly appreciated   Ext:            No lower extremity edema, no palpable cords  Pulses:      Pedal pulses intact  Neuro:   CN2-12 grossly intact (except funduscopic exam not performed)  Breasts:     Declined    Assessment/Plan     Welcome to Medicare  -Plans influenza vaccine in fall  -Plans Covid vaccine in fall   -Prior RSV 10/2023  -Prior Tdap 8/2017 repeat 10 years   -Recommend Prevnar 20  -Prior negative hep C Antibody  -Colonoscopy 3/6/2020 repeat 10 yrs  -Continue annual gynecology exams  -Mammogram 12/27/2023 sees gyne in Sept and plans to get order then  -Normal DXA before Covid so " wants to wait   -Exercises (Plays pickleball)  -Labs reviewed: from 8/8/2024  Hep C Anti, Lipid CMP CBC    ALISSON and Major depression recurrent in partial remission  -Increased anxiety,  discussed increasing celexa from 10 to 20 mg   -Previously tried Zoloft  -Suspect some component of situational anxiety  -Continue therapy   -Increase Celexa from 10 mg to 20 mg daily ordered    Gerd  -EGD 3/6/2020  with LA Grade A esophagitis  -Was on lansoprazole 30 mg, refilled   -To let me know if symptoms fail to improve    Lung Nodule  -Former smoker, quit 35 years ago  -Chest CT 8/3/2023 unchanged nodules including ring shaped nodule RLL 5 mm, Minimal aortic calcifications   -Chest CT Ordered    Fatty liver, Class 2 obesity BMI 35.04  -Discussed first line treatment is weight loss at rate of 1 pound per week  -Actively working toward weight Loss   -Normal LFTs  -Consider hep A & B vaccine if immune (check with next labs)  -Consider hepatology evaluation     Hyperlipidemia  -on Rosuvastatin    Htn  -BP controlled  -Continue losartan    Finger lesion  -No sign of infection on exam  -Possibly localized hypersensitivity reaction  -Should resolve but will let me know if it does not      Annabel Worthy, DO

## 2024-08-19 DIAGNOSIS — K21.9 GASTROESOPHAGEAL REFLUX DISEASE WITHOUT ESOPHAGITIS: Primary | ICD-10-CM

## 2024-08-19 RX ORDER — OMEPRAZOLE 20 MG/1
20 CAPSULE, DELAYED RELEASE ORAL DAILY
Qty: 90 CAPSULE | Refills: 1 | Status: SHIPPED | OUTPATIENT
Start: 2024-08-19 | End: 2025-02-15

## 2024-08-20 PROBLEM — R91.1 LUNG NODULE SEEN ON IMAGING STUDY: Status: ACTIVE | Noted: 2023-04-14

## 2024-08-20 PROBLEM — E66.812 OBESITY, CLASS II, BMI 35-39.9, ISOLATED (SEE ACTUAL BMI): Status: ACTIVE | Noted: 2024-08-20

## 2024-08-20 PROBLEM — E66.9 OBESITY, CLASS II, BMI 35-39.9, ISOLATED (SEE ACTUAL BMI): Status: ACTIVE | Noted: 2024-08-20

## 2024-08-20 PROBLEM — Z00.00 WELCOME TO MEDICARE PREVENTIVE VISIT: Status: ACTIVE | Noted: 2024-08-20

## 2024-08-20 PROBLEM — F41.1 GAD (GENERALIZED ANXIETY DISORDER): Status: ACTIVE | Noted: 2024-08-20

## 2024-08-20 PROBLEM — F33.41 MAJOR DEPRESSIVE DISORDER, RECURRENT EPISODE, IN PARTIAL REMISSION (CMS-HCC): Status: ACTIVE | Noted: 2024-08-20

## 2024-08-20 PROBLEM — K76.0 FATTY LIVER: Status: ACTIVE | Noted: 2024-08-20

## 2024-09-03 ENCOUNTER — HOSPITAL ENCOUNTER (OUTPATIENT)
Dept: RADIOLOGY | Facility: CLINIC | Age: 64
Discharge: HOME | End: 2024-09-03
Payer: MEDICARE

## 2024-09-03 DIAGNOSIS — R91.1 LUNG NODULE SEEN ON IMAGING STUDY: ICD-10-CM

## 2024-09-03 PROCEDURE — 71250 CT THORAX DX C-: CPT

## 2024-09-03 PROCEDURE — 71250 CT THORAX DX C-: CPT | Performed by: RADIOLOGY

## 2024-09-12 DIAGNOSIS — I10 PRIMARY HYPERTENSION: ICD-10-CM

## 2024-09-12 RX ORDER — LOSARTAN POTASSIUM 25 MG/1
25 TABLET ORAL DAILY
Qty: 90 TABLET | Refills: 3 | Status: SHIPPED | OUTPATIENT
Start: 2024-09-12

## 2024-09-26 ENCOUNTER — APPOINTMENT (OUTPATIENT)
Dept: OBSTETRICS AND GYNECOLOGY | Facility: CLINIC | Age: 64
End: 2024-09-26
Payer: COMMERCIAL

## 2024-09-26 DIAGNOSIS — R91.8 LUNG NODULES: Primary | ICD-10-CM

## 2024-09-26 PROBLEM — R91.1 LUNG NODULE SEEN ON IMAGING STUDY: Status: RESOLVED | Noted: 2023-04-14 | Resolved: 2024-09-26

## 2024-10-24 DIAGNOSIS — E78.5 HYPERLIPIDEMIA, UNSPECIFIED HYPERLIPIDEMIA TYPE: ICD-10-CM

## 2024-10-24 RX ORDER — ROSUVASTATIN CALCIUM 5 MG/1
5 TABLET, COATED ORAL DAILY
Qty: 90 TABLET | Refills: 2 | Status: SHIPPED | OUTPATIENT
Start: 2024-10-24

## 2024-11-24 NOTE — ASSESSMENT & PLAN NOTE
Quit smoking in 1987, incidentally noted 2 lung nodules on CT Cardiac Score, 4 mm LLL solid pleural based nodule and 2-3 mm solid nodule RLL and 4 mm ground glass nodular density RLL   [id number]

## 2024-11-25 ENCOUNTER — OFFICE VISIT (OUTPATIENT)
Dept: PULMONOLOGY | Facility: CLINIC | Age: 64
End: 2024-11-25
Payer: MEDICARE

## 2024-11-25 VITALS
WEIGHT: 218.6 LBS | HEART RATE: 88 BPM | BODY MASS INDEX: 34.72 KG/M2 | SYSTOLIC BLOOD PRESSURE: 143 MMHG | DIASTOLIC BLOOD PRESSURE: 87 MMHG | OXYGEN SATURATION: 98 % | RESPIRATION RATE: 18 BRPM | TEMPERATURE: 97.2 F

## 2024-11-25 DIAGNOSIS — R91.1 LUNG NODULE: Primary | ICD-10-CM

## 2024-11-25 PROCEDURE — 1160F RVW MEDS BY RX/DR IN RCRD: CPT | Performed by: INTERNAL MEDICINE

## 2024-11-25 PROCEDURE — 1036F TOBACCO NON-USER: CPT | Performed by: INTERNAL MEDICINE

## 2024-11-25 PROCEDURE — 99205 OFFICE O/P NEW HI 60 MIN: CPT | Performed by: INTERNAL MEDICINE

## 2024-11-25 PROCEDURE — 1159F MED LIST DOCD IN RCRD: CPT | Performed by: INTERNAL MEDICINE

## 2024-11-25 PROCEDURE — 99215 OFFICE O/P EST HI 40 MIN: CPT | Performed by: INTERNAL MEDICINE

## 2024-11-25 PROCEDURE — 3079F DIAST BP 80-89 MM HG: CPT | Performed by: INTERNAL MEDICINE

## 2024-11-25 PROCEDURE — 3077F SYST BP >= 140 MM HG: CPT | Performed by: INTERNAL MEDICINE

## 2024-11-25 ASSESSMENT — ENCOUNTER SYMPTOMS
LIGHT-HEADEDNESS: 0
SPEECH DIFFICULTY: 0
APNEA: 0
EYE DISCHARGE: 0
RHINORRHEA: 0
JOINT SWELLING: 0
SLEEP DISTURBANCE: 0
DIFFICULTY URINATING: 0
SHORTNESS OF BREATH: 0
FREQUENCY: 0
BRUISES/BLEEDS EASILY: 0
VOICE CHANGE: 1
NUMBNESS: 0
WEAKNESS: 0
ROS GI COMMENTS: ESOPHAGEAL REFLUX
DYSURIA: 0
ARTHRALGIAS: 0
UNEXPECTED WEIGHT CHANGE: 0
FATIGUE: 0
SINUS PRESSURE: 0
CONSTIPATION: 0
SINUS PAIN: 0
PALPITATIONS: 0
FEVER: 0
STRIDOR: 0
WHEEZING: 0
FACIAL SWELLING: 0
NAUSEA: 0
DIZZINESS: 0
NERVOUS/ANXIOUS: 0
ABDOMINAL DISTENTION: 0
TREMORS: 0
ADENOPATHY: 0
HEADACHES: 1
HEMATURIA: 0
CHOKING: 0
ABDOMINAL PAIN: 0
EYE REDNESS: 0
COUGH: 0
AGITATION: 0

## 2024-11-25 NOTE — LETTER
November 25, 2024     Corinne A Bazella, MD  [address] [facility], [address]  [facility] [address]    Patient: Christy Costello   YOB: 1960   Date of Visit: 11/25/2024       Dear Dr. Corinne A Bazella, MD:    Thank you for referring Christy Costello to me for evaluation. Below are my notes for this consultation.  If you have questions, please do not hesitate to call me. I look forward to following your patient along with you.       Sincerely,     Champ Solis MD MPH      CC: No Recipients  ______________________________________________________________________________________    @PULMONARY CONSULTATION@         Reason for Consult: Lung nodularity    ASSESSMENT:   The patient is a 64-year-old with a history of GERD, anxiety, fatty liver, aortic calcifications, basal cell carcinoma, depression, obesity and who  smoked for around 10 to 15 years stopping almost 40 years ago.  She does have a family history of her sister dying of lung cancer but she was a heavy smoker.  Her nodularity has been stable over the last year.  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  It does not appear any different than in 2023.  She does have hoarseness which apparently is getting worse and   this is going to be evaluated further in the future.   It very well could relate to her GERD.  Her lungs are totally clear on auscultation.    PLAN:   I have suggested a follow-up CT scan of the chest next September which will be about 1 year from the previous study.  As noted based on her most recent CT scan the groundglass nodularity in the left upper lobe is unchanged compared to the year before.        HISTORY OF PRESENT ILLNESS:   The patient is a 64-year-old with a history of GERD, anxiety, fatty liver, lung nodularity, aortic calcifications and a history of basal cell carcinomas.  She also has an  anxiety disorder and major depression on Celexa.  She is a former smoker having good smoking 35 years ago.  In addition she has obesity with a BMI of around 35.    The CT scan from September 3, 2024 revealed the following  Within the limits of this unenhanced study no hilar or mediastinal  lymphadenopathy is identified. *Stable 9 mm ground-glass nodule left  apex, axial slice 43 series 205.- *Stable 5 mm left lower lobe nodule  axial slice 204 series 205. *No new pulmonary nodules are identified.  A follow-up in 6 to 12 months was recommended      The CT scan from 2023 revealed the following    Unchanged pre-existing pulmonary nodules. Given stability no  additional follow-up required. A non-calcified pulmonary nodule/  multiple non-calcified pulmonary nodules measuring less than 6 mm,  likely benign. No further follow-up is required    The calcium score from 2022 revealed the following    1. Coronary artery calcium score of 0 *.  2. A small hiatal hernia.  3.  Few small noncalcified pulmonary nodules measuring up to 4 mm,  likely benign. No further follow-up is required, however, if the  patient has high risk factors for primary lung malignancy, follow-up  noncontrast CT scan chest in 12 months may be obtained       Currently, the patient reports that she  has been having symptoms consistent with reflux with hoarseness.  This goes back to pre-COVID.  She has been on a PPI but she does not like to take it regularly.  She does have some hayfever without asthma.  She has occasional swelling of the legs.  She has no chest pains or pressures.  She has no fevers or chills.    She is a former smoker for around 10 to 15 years up until .  She smoked less than a pack per day.  She has had basal cell carcinomas removed.  It is also noted that her sister  at age 59 of lung cancer and she was a heavy smoker and drinker.            Allergies   Allergen Reactions   • Pollen Extracts Itching and Runny  nose   • Penicillins Other     yeast infection.  No history of rash, hives, anaphylaxis, or other hypersensitivity reaction.        PAST MEDICAL HISTORY:    history of GERD, anxiety, fatty liver, lung nodularity, aortic calcifications and a history of basal cell carcinomas.  She also has an anxiety disorder and major depression on Celexa    Current Outpatient Medications:   •  cholecalciferol (Vitamin D-3) 125 MCG (5000 UT) capsule, Take 1 capsule (125 mcg) by mouth once daily., Disp: , Rfl:   •  citalopram (CeleXA) 20 mg tablet, Take 1 tablet (20 mg) by mouth once daily., Disp: 90 tablet, Rfl: 1  •  elderberry fruit and flower 460-115 mg capsule, Take 1 tablet by mouth once daily as needed., Disp: , Rfl:   •  losartan (Cozaar) 25 mg tablet, Take 1 tablet (25 mg) by mouth once daily., Disp: 90 tablet, Rfl: 3  •  magnesium oxide 500 mg capsule, Take 1 capsule (500 mg) by mouth once daily., Disp: , Rfl:   •  omeprazole (PriLOSEC) 20 mg DR capsule, Take 1 capsule (20 mg) by mouth once daily. Do not crush or chew., Disp: 90 capsule, Rfl: 1  •  rosuvastatin (Crestor) 5 mg tablet, Take 1 tablet (5 mg) by mouth once daily., Disp: 90 tablet, Rfl: 2     FAMILY HISTORY:   Sister  of lung cancer at age 59  SOCIAL HISTORY:  The patient smoked around 10 to 15 years less than a pack per day stopping in .  She does not drink.  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   HENT:  Positive for voice change. Negative for congestion, facial swelling, nosebleeds, postnasal drip, rhinorrhea, sinus pressure and sinus pain.    Eyes:  Negative for discharge, redness and visual disturbance.   Respiratory:  Negative for apnea, cough, choking, shortness of breath, wheezing and stridor.    Cardiovascular:  Negative for chest pain, palpitations and leg swelling.   Gastrointestinal:  Negative for  abdominal distention, abdominal pain, constipation and nausea.        Esophageal reflux   Endocrine: Negative for cold intolerance and heat intolerance.   Genitourinary:  Negative for difficulty urinating, dysuria, frequency and hematuria.   Musculoskeletal:  Negative for arthralgias, gait problem and joint swelling.   Allergic/Immunologic: Negative for environmental allergies, food allergies and immunocompromised state.   Neurological:  Positive for headaches. Negative for dizziness, tremors, syncope, speech difficulty, weakness, light-headedness and numbness.   Hematological:  Negative for adenopathy. Does not bruise/bleed easily.   Psychiatric/Behavioral:  Negative for agitation, behavioral problems and sleep disturbance. The patient is not nervous/anxious.         Vitals:    11/25/24 1233   BP: 143/87   Pulse: 88   Resp: 18   Temp: 36.2 °C (97.2 °F)   SpO2: 98%        Physical Exam  Vitals reviewed.   Constitutional:       Appearance: Normal appearance.   HENT:      Head: Normocephalic and atraumatic.   Eyes:      Extraocular Movements: Extraocular movements intact.   Cardiovascular:      Rate and Rhythm: Normal rate and regular rhythm.      Heart sounds: No murmur heard.     No friction rub. No gallop.   Pulmonary:      Effort: Pulmonary effort is normal. No respiratory distress.      Breath sounds: Normal breath sounds. No stridor. No wheezing, rhonchi or rales.   Chest:      Chest wall: No tenderness.   Abdominal:      General: Abdomen is flat. There is no distension.      Palpations: Abdomen is soft. There is no mass.      Tenderness: There is no abdominal tenderness.   Musculoskeletal:         General: Normal range of motion.      Cervical back: Normal range of motion.      Right lower leg: No edema.      Left lower leg: No edema.   Skin:     General: Skin is warm and dry.   Neurological:      Mental Status: She is alert and oriented to person, place, and time.   Psychiatric:         Mood and Affect: Mood  normal.         Behavior: Behavior normal.

## 2024-11-25 NOTE — PROGRESS NOTES
@PULMONARY CONSULTATION@         Reason for Consult: Lung nodularity    ASSESSMENT:   The patient is a 64-year-old with a history of GERD, anxiety, fatty liver, aortic calcifications, basal cell carcinoma, depression, obesity and who  smoked for around 10 to 15 years stopping almost 40 years ago.  She does have a family history of her sister dying of lung cancer but she was a heavy smoker.  Her nodularity has been stable over the last year.  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  It does not appear any different than in 2023.  She does have hoarseness which apparently is getting worse and   this is going to be evaluated further in the future.   It very well could relate to her GERD.  Her lungs are totally clear on auscultation.    PLAN:   I have suggested a follow-up CT scan of the chest next September which will be about 1 year from the previous study.  As noted based on her most recent CT scan the groundglass nodularity in the left upper lobe is unchanged compared to the year before.        HISTORY OF PRESENT ILLNESS:   The patient is a 64-year-old with a history of GERD, anxiety, fatty liver, lung nodularity, aortic calcifications and a history of basal cell carcinomas.  She also has an anxiety disorder and major depression on Celexa.  She is a former smoker having good smoking 35 years ago.  In addition she has obesity with a BMI of around 35.    The CT scan from September 3, 2024 revealed the following  Within the limits of this unenhanced study no hilar or mediastinal  lymphadenopathy is identified. *Stable 9 mm ground-glass nodule left  apex, axial slice 43 series 205.- *Stable 5 mm left lower lobe nodule  axial slice 204 series 205. *No new pulmonary nodules are identified.  A follow-up in 6 to 12 months was recommended      The CT scan from August 2, 2023 revealed the following    Unchanged pre-existing  pulmonary nodules. Given stability no  additional follow-up required. A non-calcified pulmonary nodule/  multiple non-calcified pulmonary nodules measuring less than 6 mm,  likely benign. No further follow-up is required    The calcium score from 2022 revealed the following    1. Coronary artery calcium score of 0 *.  2. A small hiatal hernia.  3.  Few small noncalcified pulmonary nodules measuring up to 4 mm,  likely benign. No further follow-up is required, however, if the  patient has high risk factors for primary lung malignancy, follow-up  noncontrast CT scan chest in 12 months may be obtained       Currently, the patient reports that she  has been having symptoms consistent with reflux with hoarseness.  This goes back to pre-COVID.  She has been on a PPI but she does not like to take it regularly.  She does have some hayfever without asthma.  She has occasional swelling of the legs.  She has no chest pains or pressures.  She has no fevers or chills.    She is a former smoker for around 10 to 15 years up until .  She smoked less than a pack per day.  She has had basal cell carcinomas removed.  It is also noted that her sister  at age 59 of lung cancer and she was a heavy smoker and drinker.            Allergies   Allergen Reactions    Pollen Extracts Itching and Runny nose    Penicillins Other     yeast infection.  No history of rash, hives, anaphylaxis, or other hypersensitivity reaction.        PAST MEDICAL HISTORY:    history of GERD, anxiety, fatty liver, lung nodularity, aortic calcifications and a history of basal cell carcinomas.  She also has an anxiety disorder and major depression on Celexa    Current Outpatient Medications:     cholecalciferol (Vitamin D-3) 125 MCG (5000 UT) capsule, Take 1 capsule (125 mcg) by mouth once daily., Disp: , Rfl:     citalopram (CeleXA) 20 mg tablet, Take 1 tablet (20 mg) by mouth once daily., Disp: 90 tablet, Rfl: 1    elderberry fruit and flower  460-115 mg capsule, Take 1 tablet by mouth once daily as needed., Disp: , Rfl:     losartan (Cozaar) 25 mg tablet, Take 1 tablet (25 mg) by mouth once daily., Disp: 90 tablet, Rfl: 3    magnesium oxide 500 mg capsule, Take 1 capsule (500 mg) by mouth once daily., Disp: , Rfl:     omeprazole (PriLOSEC) 20 mg DR capsule, Take 1 capsule (20 mg) by mouth once daily. Do not crush or chew., Disp: 90 capsule, Rfl: 1    rosuvastatin (Crestor) 5 mg tablet, Take 1 tablet (5 mg) by mouth once daily., Disp: 90 tablet, Rfl: 2     FAMILY HISTORY:   Sister  of lung cancer at age 59  SOCIAL HISTORY:  The patient smoked around 10 to 15 years less than a pack per day stopping in .  She does not drink.  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   HENT:  Positive for voice change. Negative for congestion, facial swelling, nosebleeds, postnasal drip, rhinorrhea, sinus pressure and sinus pain.    Eyes:  Negative for discharge, redness and visual disturbance.   Respiratory:  Negative for apnea, cough, choking, shortness of breath, wheezing and stridor.    Cardiovascular:  Negative for chest pain, palpitations and leg swelling.   Gastrointestinal:  Negative for abdominal distention, abdominal pain, constipation and nausea.        Esophageal reflux   Endocrine: Negative for cold intolerance and heat intolerance.   Genitourinary:  Negative for difficulty urinating, dysuria, frequency and hematuria.   Musculoskeletal:  Negative for arthralgias, gait problem and joint swelling.   Allergic/Immunologic: Negative for environmental allergies, food allergies and immunocompromised state.   Neurological:  Positive for headaches. Negative for dizziness, tremors, syncope, speech difficulty, weakness, light-headedness and numbness.   Hematological:  Negative for adenopathy. Does not bruise/bleed easily.    Psychiatric/Behavioral:  Negative for agitation, behavioral problems and sleep disturbance. The patient is not nervous/anxious.         Vitals:    11/25/24 1233   BP: 143/87   Pulse: 88   Resp: 18   Temp: 36.2 °C (97.2 °F)   SpO2: 98%        Physical Exam  Vitals reviewed.   Constitutional:       Appearance: Normal appearance.   HENT:      Head: Normocephalic and atraumatic.   Eyes:      Extraocular Movements: Extraocular movements intact.   Cardiovascular:      Rate and Rhythm: Normal rate and regular rhythm.      Heart sounds: No murmur heard.     No friction rub. No gallop.   Pulmonary:      Effort: Pulmonary effort is normal. No respiratory distress.      Breath sounds: Normal breath sounds. No stridor. No wheezing, rhonchi or rales.   Chest:      Chest wall: No tenderness.   Abdominal:      General: Abdomen is flat. There is no distension.      Palpations: Abdomen is soft. There is no mass.      Tenderness: There is no abdominal tenderness.   Musculoskeletal:         General: Normal range of motion.      Cervical back: Normal range of motion.      Right lower leg: No edema.      Left lower leg: No edema.   Skin:     General: Skin is warm and dry.   Neurological:      Mental Status: She is alert and oriented to person, place, and time.   Psychiatric:         Mood and Affect: Mood normal.         Behavior: Behavior normal.

## 2024-11-25 NOTE — PATIENT INSTRUCTIONS
Based on the stability of the nodularity noted on your CT scan particularly the groundglass change in the left upper lobe over the last year, obtaining a follow-up CT scan in September 2025 is reasonable.  The order has been written.  This can be scheduled by calling

## 2024-12-04 PROBLEM — M25.512 CHRONIC LEFT SHOULDER PAIN: Status: ACTIVE | Noted: 2018-11-06

## 2024-12-04 PROBLEM — L65.9 HAIR LOSS: Status: ACTIVE | Noted: 2024-12-04

## 2024-12-04 PROBLEM — J20.9 ACUTE BRONCHITIS: Status: ACTIVE | Noted: 2024-12-04

## 2024-12-04 PROBLEM — F41.1 GAD (GENERALIZED ANXIETY DISORDER): Status: ACTIVE | Noted: 2018-02-21

## 2024-12-04 PROBLEM — F33.1 MODERATE EPISODE OF RECURRENT MAJOR DEPRESSIVE DISORDER: Status: ACTIVE | Noted: 2018-02-21

## 2024-12-04 PROBLEM — G89.29 CHRONIC LEFT SHOULDER PAIN: Status: ACTIVE | Noted: 2018-11-06

## 2024-12-04 PROBLEM — R15.9 INCONTINENCE OF FECES: Status: ACTIVE | Noted: 2024-12-04

## 2024-12-04 PROBLEM — E66.9 OBESITY: Status: ACTIVE | Noted: 2018-10-03

## 2024-12-04 PROBLEM — N81.4 CYSTOCELE WITH UTERINE PROLAPSE: Status: ACTIVE | Noted: 2017-08-03

## 2024-12-09 ENCOUNTER — APPOINTMENT (OUTPATIENT)
Dept: PULMONOLOGY | Facility: CLINIC | Age: 64
End: 2024-12-09
Payer: MEDICARE

## 2025-01-06 ENCOUNTER — HOSPITAL ENCOUNTER (OUTPATIENT)
Dept: RADIOLOGY | Facility: CLINIC | Age: 65
Discharge: HOME | End: 2025-01-06
Payer: MEDICARE

## 2025-01-06 DIAGNOSIS — Z12.31 BREAST CANCER SCREENING BY MAMMOGRAM: ICD-10-CM

## 2025-01-06 PROCEDURE — 77067 SCR MAMMO BI INCL CAD: CPT

## 2025-01-06 PROCEDURE — 77067 SCR MAMMO BI INCL CAD: CPT | Performed by: RADIOLOGY

## 2025-01-06 PROCEDURE — 77063 BREAST TOMOSYNTHESIS BI: CPT | Performed by: RADIOLOGY

## 2025-01-22 DIAGNOSIS — F33.41 MAJOR DEPRESSIVE DISORDER, RECURRENT EPISODE, IN PARTIAL REMISSION (CMS-HCC): ICD-10-CM

## 2025-01-22 DIAGNOSIS — F41.1 GAD (GENERALIZED ANXIETY DISORDER): ICD-10-CM

## 2025-01-22 RX ORDER — CITALOPRAM 20 MG/1
20 TABLET, FILM COATED ORAL DAILY
Qty: 90 TABLET | Refills: 1 | Status: SHIPPED | OUTPATIENT
Start: 2025-01-22 | End: 2025-01-23 | Stop reason: DRUGHIGH

## 2025-01-23 DIAGNOSIS — F41.9 ANXIETY: Primary | ICD-10-CM

## 2025-01-23 RX ORDER — CITALOPRAM 10 MG/1
10 TABLET ORAL DAILY
Qty: 90 TABLET | Refills: 1 | Status: SHIPPED | OUTPATIENT
Start: 2025-01-23 | End: 2025-07-22

## 2025-07-04 DIAGNOSIS — F41.9 ANXIETY: ICD-10-CM

## 2025-07-06 RX ORDER — CITALOPRAM 10 MG/1
10 TABLET ORAL DAILY
Qty: 90 TABLET | Refills: 1 | Status: SHIPPED | OUTPATIENT
Start: 2025-07-06

## 2025-07-21 DIAGNOSIS — E78.5 HYPERLIPIDEMIA, UNSPECIFIED HYPERLIPIDEMIA TYPE: ICD-10-CM

## 2025-07-24 RX ORDER — ROSUVASTATIN CALCIUM 5 MG/1
5 TABLET, COATED ORAL DAILY
Qty: 90 TABLET | Refills: 0 | Status: SHIPPED | OUTPATIENT
Start: 2025-07-24

## 2025-08-20 ENCOUNTER — APPOINTMENT (OUTPATIENT)
Dept: PRIMARY CARE | Facility: CLINIC | Age: 65
End: 2025-08-20
Payer: MEDICARE

## 2025-09-04 ENCOUNTER — HOSPITAL ENCOUNTER (OUTPATIENT)
Dept: RADIOLOGY | Facility: CLINIC | Age: 65
Discharge: HOME | End: 2025-09-04
Payer: MEDICARE

## 2025-09-04 DIAGNOSIS — R91.1 LUNG NODULE: ICD-10-CM

## 2025-09-04 PROCEDURE — 71250 CT THORAX DX C-: CPT | Performed by: RADIOLOGY

## 2025-09-04 PROCEDURE — 71250 CT THORAX DX C-: CPT

## 2025-09-06 ENCOUNTER — RESULTS FOLLOW-UP (OUTPATIENT)
Dept: PULMONOLOGY | Facility: HOSPITAL | Age: 65
End: 2025-09-06
Payer: MEDICARE

## 2025-09-06 DIAGNOSIS — R91.1 LUNG NODULE: Primary | ICD-10-CM

## 2025-09-09 ENCOUNTER — APPOINTMENT (OUTPATIENT)
Dept: PRIMARY CARE | Facility: CLINIC | Age: 65
End: 2025-09-09
Payer: MEDICARE

## 2025-10-01 ENCOUNTER — APPOINTMENT (OUTPATIENT)
Dept: PRIMARY CARE | Facility: CLINIC | Age: 65
End: 2025-10-01
Payer: MEDICARE

## 2025-11-18 ENCOUNTER — APPOINTMENT (OUTPATIENT)
Dept: INTEGRATIVE MEDICINE | Facility: CLINIC | Age: 65
End: 2025-11-18
Payer: MEDICARE